# Patient Record
Sex: FEMALE | Race: WHITE | NOT HISPANIC OR LATINO
[De-identification: names, ages, dates, MRNs, and addresses within clinical notes are randomized per-mention and may not be internally consistent; named-entity substitution may affect disease eponyms.]

---

## 2022-03-29 ENCOUNTER — APPOINTMENT (OUTPATIENT)
Dept: FAMILY MEDICINE | Facility: CLINIC | Age: 42
End: 2022-03-29
Payer: COMMERCIAL

## 2022-03-29 VITALS
HEIGHT: 66 IN | BODY MASS INDEX: 18.8 KG/M2 | OXYGEN SATURATION: 98 % | HEART RATE: 72 BPM | TEMPERATURE: 97 F | SYSTOLIC BLOOD PRESSURE: 118 MMHG | WEIGHT: 117 LBS | DIASTOLIC BLOOD PRESSURE: 80 MMHG

## 2022-03-29 DIAGNOSIS — J01.90 ACUTE SINUSITIS, UNSPECIFIED: ICD-10-CM

## 2022-03-29 DIAGNOSIS — R22.1 LOCALIZED SWELLING, MASS AND LUMP, NECK: ICD-10-CM

## 2022-03-29 DIAGNOSIS — R59.1 GENERALIZED ENLARGED LYMPH NODES: ICD-10-CM

## 2022-03-29 PROCEDURE — 99203 OFFICE O/P NEW LOW 30 MIN: CPT

## 2022-03-29 RX ORDER — AZITHROMYCIN 250 MG/1
250 TABLET, FILM COATED ORAL
Qty: 1 | Refills: 0 | Status: ACTIVE | COMMUNITY
Start: 2022-03-29 | End: 1900-01-01

## 2022-03-29 NOTE — HISTORY OF PRESENT ILLNESS
[[email or web address]] : new pcp developed left neck mass no cough or fever has sinus discomfort wisdom tooth growing in seen at walk in [facility] diag with left otitis but not placed on ab's no medical history and on no daily medications

## 2022-03-29 NOTE — PHYSICAL EXAM
[Normal] : normal rate, regular rhythm, normal S1 and S2 and no murmur heard [de-identified] : small left neck mass prob posteriorcervical node

## 2024-05-01 ENCOUNTER — APPOINTMENT (RX ONLY)
Dept: URBAN - METROPOLITAN AREA CLINIC 125 | Facility: CLINIC | Age: 44
Setting detail: DERMATOLOGY
End: 2024-05-01

## 2024-05-01 DIAGNOSIS — Z71.89 OTHER SPECIFIED COUNSELING: ICD-10-CM

## 2024-05-01 DIAGNOSIS — Z80.8 FAMILY HISTORY OF MALIGNANT NEOPLASM OF OTHER ORGANS OR SYSTEMS: ICD-10-CM

## 2024-05-01 DIAGNOSIS — L82.1 OTHER SEBORRHEIC KERATOSIS: ICD-10-CM

## 2024-05-01 PROCEDURE — ? DEFER

## 2024-05-01 PROCEDURE — ? COUNSELING

## 2024-05-01 PROCEDURE — 99202 OFFICE O/P NEW SF 15 MIN: CPT

## 2024-05-01 ASSESSMENT — LOCATION SIMPLE DESCRIPTION DERM
LOCATION SIMPLE: LEFT CALF
LOCATION SIMPLE: CHEST

## 2024-05-01 ASSESSMENT — LOCATION ZONE DERM
LOCATION ZONE: LEG
LOCATION ZONE: TRUNK

## 2024-05-01 ASSESSMENT — LOCATION DETAILED DESCRIPTION DERM
LOCATION DETAILED: MIDDLE STERNUM
LOCATION DETAILED: UPPER STERNUM
LOCATION DETAILED: LEFT PROXIMAL CALF

## 2024-05-01 NOTE — PROCEDURE: DEFER
Procedure To Be Performed At Next Visit: Full Skin Check
X Size Of Lesion In Cm (Optional): 0
Detail Level: Detailed
Introduction Text (Please End With A Colon): Patient to schedule:

## 2024-05-01 NOTE — PROCEDURE: COUNSELING
Detail Level: Generalized
Detail Level: Detailed
Skin Checks Recommendations: Recommend routine FBSE's by Dermatology provider for skin cancer surveillance/prevention.

## 2025-02-03 ENCOUNTER — APPOINTMENT (OUTPATIENT)
Dept: URBAN - METROPOLITAN AREA CLINIC 126 | Facility: CLINIC | Age: 45
Setting detail: DERMATOLOGY
End: 2025-02-03

## 2025-02-03 ENCOUNTER — RX ONLY (RX ONLY)
Age: 45
End: 2025-02-03

## 2025-02-03 DIAGNOSIS — Z41.9 ENCOUNTER FOR PROCEDURE FOR PURPOSES OTHER THAN REMEDYING HEALTH STATE, UNSPECIFIED: ICD-10-CM

## 2025-02-03 PROCEDURE — ? BOTOX

## 2025-02-03 PROCEDURE — ? COSMETIC CONSULTATION: BBL

## 2025-02-03 PROCEDURE — ? COSMETIC CONSULTATION: BOTOX

## 2025-02-03 PROCEDURE — ? ADDITIONAL NOTES

## 2025-02-03 RX ORDER — TRETINOIN 0.5 MG/G
PEA-SIZED AMOUNT GEL TOPICAL ONCE A DAY
Qty: 45 | Refills: 0 | COMMUNITY
Start: 2025-02-03

## 2025-02-03 NOTE — PROCEDURE: ADDITIONAL NOTES
Render Risk Assessment In Note?: no
Additional Notes: Pre BBL Instructions \\n\\nNo natural sun exposure, use of tanning beds, use of tanning lotions and/or spray tan application for 4 weeks prior to treatment  \\n\\nSome medications or supplements may increase your risk of bruising. Arnica is recommended to decrease the likelihood of bruising \\n\\nAvoid products containing any exfoliating agents (retinoic acid, tretinoin, retinol, benzoyl peroxide, glycolic acid, salicylic acid, astringents, etc. for 3 days prior to treatment) \\n\\nIf you have any history of herpes or cold sores, we highly recommend a course of anti-viral medication pre and post treatment. This will prevent an outbreak of cold sores if you are prone to them \\n\\nYour provider will recommend appropriate products to help enhance results and minimize potential side effects  \\n\\n \\n\\nPost BBL Instructions \\n\\nBruising, redness and/or swelling are common and will resolve in 7-10 days \\n\\nMineral makeup may be applied immediately following treatment \\n\\nThe pigment treated will turn darker (brown to black) within 24-48 hours \\n\\nDo not pick at the treated areas as this will most likely cause scarring. Dry areas should be kept hydrated with a gentle fragrance-free moisturizer \\n\\nWhen treating the face, the treated pigment will begin to shed off in approximately 1 week \\n\\nWhen treating the body, the treated pigment will begin to shed off in approximately 2-3 weeks \\n\\nAvoid sun exposure and use a broad-spectrum sunscreen \\n\\nAvoid heat, saunas, hot tubs and athletic activities that would cause sweating until skin is completely healed  \\n\\nAvoid products containing any exfoliating agents (retinoic acid, tretinoin, retinol, benzoyl peroxide, glycolic acid, salicylic acid, astringents, etc. for 7 days after your treatment) \\n\\nYou may resume your regular skin care routine 7 days after your treatment
Detail Level: Simple
Additional Notes: Botox Cosmetic/Dysport/Xeomin/Daxxify Post Treatment Instructions \\n\\n \\n\\n \\n\\nDo: \\n\\nRemain upright for 4 hours after injection. \\n\\nExaggerate facial expressions in injected areas for 1 hour after injections. \\n\\nCall the office immediately if you experience any problems or have any questions. \\n\\nSchedule your follow up appointment prior to leaving the office. \\n\\n \\n\\nDo Not: \\n\\nTake ibuprofen, Aspirin or Vitamin E for 24 hours after injections \\n\\nDO NOT massage or manipulate injection sites for 48 hours (about 2 days) after injections. \\n\\nAvoid vigorous exercise for 24 hours post injection.  \\n\\n \\n\\nResults are expected within one - two weeks after injection.

## 2025-02-03 NOTE — PROCEDURE: BOTOX
Expiration Date (Month Year): 08/2025
Right Periorbital Skin Units: 0
Show Depressor Anguli Units: Yes
Show Mentalis Units: No
Additional Area 2 Location: right and left eyebrow
Incrementing Botox Units: By 0.5 Units
Additional Area 6 Location: lower lip lines
Consent: Written consent obtained. Risks include but not limited to lid/brow ptosis, bruising, swelling, diplopia, temporary effect, incomplete chemical denervation.
Additional Area 1 Location: chin
Show Inventory Tab: Show
Dilution (U/0.1 Cc): 0.5
Additional Area 5 Location: upper lip lines
Post-Care Instructions: Patient instructed to not lie down for 4 hours and limit physical activity for 24 hours. Patient instructed not to travel by airplane for 48 hours.
Forehead Units: 12
Comments: Lot [id number]
Additional Area 4 Location: parentheses
Glabellar Complex Units: 25
Detail Level: Simple
Lot #: [id number]

## 2025-03-03 ENCOUNTER — APPOINTMENT (OUTPATIENT)
Dept: URBAN - METROPOLITAN AREA CLINIC 126 | Facility: CLINIC | Age: 45
Setting detail: DERMATOLOGY
End: 2025-03-03

## 2025-03-03 DIAGNOSIS — Z41.9 ENCOUNTER FOR PROCEDURE FOR PURPOSES OTHER THAN REMEDYING HEALTH STATE, UNSPECIFIED: ICD-10-CM

## 2025-03-03 PROCEDURE — ? SCITON BBL HERO

## 2025-03-03 PROCEDURE — ? DIAGNOSIS COMMENT

## 2025-03-03 NOTE — PROCEDURE: SCITON BBL HERO
Fluence (J/Cm2) - Will Not Render If 0: 0
Number Of Prepaid Treatments (Will Not Render If 0): 3
Passes (Optional): face only
Filter: 532nm Filter
Add Setting 2?: yes
Total Pulses (Optional): 432
Fluence (J/Cm2) - Will Not Render If 0: 6
Spot Size: Finesse Adapter Size: 15 x 15 mm square
Pulse Width Units: milliseconds
Total Pulses (Optional): 8
Temp (C): 25
Spot Size: Finesse Adapter Size: 7 mm round
Add Setting 6?: no
Rate (Hz): 4.0
Pulse Width - Will Not Render If 0: 20
Fluence (J/Cm2) - Will Not Render If 0: 12
Filter: 515nm Filter
Total Pulses (Optional): 67
Detail Level: Simple
Passes (Optional): neck and chest
Consent: Written consent obtained.  Risks reviewed including but not limited to crusting, scabbing, blistering, scarring, darker or lighter pigmentary change, and incomplete clearance.
Anesthesia Type: 1% lidocaine with epinephrine
Procedure Note: After applying gel and applying protective eyeware, treatment was administered using the setting parameters listed above.
Temp (C): 15
Post-Care Instructions: I reviewed with the patient in detail post-care instructions. Patient should avoid sun exposure before and after treatment.  Patient should wear sunscreen.
Treatment Number: 1
Price (Use Numbers Only, No Special Characters Or $): 6203
Spot Size: Finesse Adapter Size: 15 x 45 mm (No Finesse Adapter)

## 2025-03-03 NOTE — PROCEDURE: DIAGNOSIS COMMENT
Render Risk Assessment In Note?: no
Detail Level: Simple
Comment: Pt will come early for next appt numbing

## 2025-04-07 ENCOUNTER — APPOINTMENT (OUTPATIENT)
Dept: URBAN - METROPOLITAN AREA CLINIC 126 | Facility: CLINIC | Age: 45
Setting detail: DERMATOLOGY
End: 2025-04-07

## 2025-04-07 DIAGNOSIS — Z41.9 ENCOUNTER FOR PROCEDURE FOR PURPOSES OTHER THAN REMEDYING HEALTH STATE, UNSPECIFIED: ICD-10-CM

## 2025-04-07 PROCEDURE — ? SCITON BBL HERO

## 2025-04-07 NOTE — PROCEDURE: SCITON BBL HERO
Rate (Hz): 4.0
Filter: 515nm Filter
External Cooling Fan Speed: 0
Spot Size: Finesse Adapter Size: 15 x 15 mm square
Pulse Width Units: milliseconds
Fluence (J/Cm2) - Will Not Render If 0: 10
Add Setting 4?: yes
Pulse Width - Will Not Render If 0: 15
Filter: 560nm Filter
Add Setting 6?: no
Passes (Optional): face
Temp (C): 25
Detail Level: Simple
Passes (Optional): face, neck and chest
Total Pulses (Optional): 266
Consent: Written consent obtained.  Risks reviewed including but not limited to crusting, scabbing, blistering, scarring, darker or lighter pigmentary change, and incomplete clearance.
Total Pulses (Optional): 847
Procedure Note: After applying gel and applying protective eyeware, treatment was administered using the setting parameters listed above.
Price (Use Numbers Only, No Special Characters Or $): 8214
Temp (C): 20
Fluence (J/Cm2) - Will Not Render If 0: 6
Post-Care Instructions: I reviewed with the patient in detail post-care instructions. Patient should avoid sun exposure before and after treatment.  Patient should wear sunscreen.
Anesthesia Type: 1% lidocaine with epinephrine
Treatment Number: 2
Pulse Width - Will Not Render If 0: 3
Spot Size: Finesse Adapter Size: 15 x 45 mm (No Finesse Adapter)
Spot Size: Finesse Adapter Size: 7 mm round
Total Pulses (Optional): 236
Fluence (J/Cm2) - Will Not Render If 0: 5
Total Pulses (Optional): 31

## 2025-05-19 ENCOUNTER — APPOINTMENT (OUTPATIENT)
Dept: URBAN - METROPOLITAN AREA CLINIC 126 | Facility: CLINIC | Age: 45
Setting detail: DERMATOLOGY
End: 2025-05-19

## 2025-05-19 DIAGNOSIS — Z41.9 ENCOUNTER FOR PROCEDURE FOR PURPOSES OTHER THAN REMEDYING HEALTH STATE, UNSPECIFIED: ICD-10-CM

## 2025-05-19 PROCEDURE — ? SCITON BBL HERO

## 2025-05-19 NOTE — PROCEDURE: SCITON BBL HERO
Rate (Hz): 4.0
Filter: 560nm Filter
External Cooling Fan Speed: 0
Spot Size: Finesse Adapter Size: 15 x 15 mm square
Pulse Width Units: milliseconds
Fluence (J/Cm2) - Will Not Render If 0: 6
Add Setting 4?: no
Pulse Width - Will Not Render If 0: 3
Passes (Optional): face
Temp (C): 25
Detail Level: Simple
Passes (Optional): face, neck and chest
Total Pulses (Optional): 187
Consent: Written consent obtained.  Risks reviewed including but not limited to crusting, scabbing, blistering, scarring, darker or lighter pigmentary change, and incomplete clearance.
Total Pulses (Optional): 847
Procedure Note: After applying gel and applying protective eyeware, treatment was administered using the setting parameters listed above.
Add Setting 3?: yes
Price (Use Numbers Only, No Special Characters Or $): 1113
Fluence (J/Cm2) - Will Not Render If 0: 7
Post-Care Instructions: I reviewed with the patient in detail post-care instructions. Patient should avoid sun exposure before and after treatment.  Patient should wear sunscreen.
Anesthesia Type: 1% lidocaine with epinephrine
Spot Size: Finesse Adapter Size: 15 x 45 mm (No Finesse Adapter)
Rate (Hz): 4
Total Pulses (Optional): 248
Passes (Optional): neck and chest
Fluence (J/Cm2) - Will Not Render If 0: 5
Total Pulses (Optional): 517